# Patient Record
Sex: MALE | NOT HISPANIC OR LATINO | Employment: FULL TIME | URBAN - METROPOLITAN AREA
[De-identification: names, ages, dates, MRNs, and addresses within clinical notes are randomized per-mention and may not be internally consistent; named-entity substitution may affect disease eponyms.]

---

## 2023-11-30 ENCOUNTER — OFFICE VISIT (OUTPATIENT)
Dept: INTERNAL MEDICINE | Facility: CLINIC | Age: 43
End: 2023-11-30
Payer: COMMERCIAL

## 2023-11-30 VITALS
BODY MASS INDEX: 34.94 KG/M2 | DIASTOLIC BLOOD PRESSURE: 78 MMHG | HEIGHT: 75 IN | RESPIRATION RATE: 12 BRPM | SYSTOLIC BLOOD PRESSURE: 124 MMHG | WEIGHT: 281 LBS | HEART RATE: 80 BPM | TEMPERATURE: 97.7 F

## 2023-11-30 DIAGNOSIS — Z11.59 ENCOUNTER FOR HEPATITIS C SCREENING TEST FOR LOW RISK PATIENT: ICD-10-CM

## 2023-11-30 DIAGNOSIS — F41.9 ANXIETY: ICD-10-CM

## 2023-11-30 DIAGNOSIS — F33.3 SEVERE EPISODE OF RECURRENT MAJOR DEPRESSIVE DISORDER, WITH PSYCHOTIC FEATURES: ICD-10-CM

## 2023-11-30 DIAGNOSIS — F39 MOOD DISORDER: Primary | ICD-10-CM

## 2023-11-30 PROBLEM — E66.9 OBESITY (BMI 35.0-39.9 WITHOUT COMORBIDITY): Status: ACTIVE | Noted: 2023-11-30

## 2023-11-30 PROBLEM — E66.01 MORBID (SEVERE) OBESITY DUE TO EXCESS CALORIES: Status: ACTIVE | Noted: 2023-11-30

## 2023-11-30 PROBLEM — I10 HYPERTENSION: Status: ACTIVE | Noted: 2023-11-30

## 2023-11-30 LAB
ALBUMIN SERPL-MCNC: 4.8 G/DL (ref 3.5–5.2)
ALBUMIN/GLOB SERPL: 1.7 G/DL
ALP SERPL-CCNC: 67 U/L (ref 39–117)
ALT SERPL W P-5'-P-CCNC: 58 U/L (ref 1–41)
ANION GAP SERPL CALCULATED.3IONS-SCNC: 11.1 MMOL/L (ref 5–15)
AST SERPL-CCNC: 32 U/L (ref 1–40)
BASOPHILS # BLD AUTO: 0.02 10*3/MM3 (ref 0–0.2)
BASOPHILS NFR BLD AUTO: 0.3 % (ref 0–1.5)
BILIRUB SERPL-MCNC: 0.4 MG/DL (ref 0–1.2)
BUN SERPL-MCNC: 11 MG/DL (ref 6–20)
BUN/CREAT SERPL: 11.1 (ref 7–25)
CALCIUM SPEC-SCNC: 9.7 MG/DL (ref 8.6–10.5)
CHLORIDE SERPL-SCNC: 103 MMOL/L (ref 98–107)
CHOLEST SERPL-MCNC: 208 MG/DL (ref 0–200)
CO2 SERPL-SCNC: 27.9 MMOL/L (ref 22–29)
CREAT SERPL-MCNC: 0.99 MG/DL (ref 0.76–1.27)
DEPRECATED RDW RBC AUTO: 38.9 FL (ref 37–54)
EGFRCR SERPLBLD CKD-EPI 2021: 98.1 ML/MIN/1.73
EOSINOPHIL # BLD AUTO: 0.1 10*3/MM3 (ref 0–0.4)
EOSINOPHIL NFR BLD AUTO: 1.7 % (ref 0.3–6.2)
ERYTHROCYTE [DISTWIDTH] IN BLOOD BY AUTOMATED COUNT: 13.1 % (ref 12.3–15.4)
GLOBULIN UR ELPH-MCNC: 2.8 GM/DL
GLUCOSE SERPL-MCNC: 84 MG/DL (ref 65–99)
HCT VFR BLD AUTO: 45.2 % (ref 37.5–51)
HCV AB SER DONR QL: NORMAL
HDLC SERPL-MCNC: 41 MG/DL (ref 40–60)
HGB BLD-MCNC: 15.4 G/DL (ref 13–17.7)
IMM GRANULOCYTES # BLD AUTO: 0.06 10*3/MM3 (ref 0–0.05)
IMM GRANULOCYTES NFR BLD AUTO: 1 % (ref 0–0.5)
LDLC SERPL CALC-MCNC: 143 MG/DL (ref 0–100)
LDLC/HDLC SERPL: 3.43 {RATIO}
LYMPHOCYTES # BLD AUTO: 1.51 10*3/MM3 (ref 0.7–3.1)
LYMPHOCYTES NFR BLD AUTO: 25 % (ref 19.6–45.3)
MCH RBC QN AUTO: 28.1 PG (ref 26.6–33)
MCHC RBC AUTO-ENTMCNC: 34.1 G/DL (ref 31.5–35.7)
MCV RBC AUTO: 82.5 FL (ref 79–97)
MONOCYTES # BLD AUTO: 0.54 10*3/MM3 (ref 0.1–0.9)
MONOCYTES NFR BLD AUTO: 8.9 % (ref 5–12)
NEUTROPHILS NFR BLD AUTO: 3.81 10*3/MM3 (ref 1.7–7)
NEUTROPHILS NFR BLD AUTO: 63.1 % (ref 42.7–76)
NRBC BLD AUTO-RTO: 0 /100 WBC (ref 0–0.2)
PLATELET # BLD AUTO: 169 10*3/MM3 (ref 140–450)
PMV BLD AUTO: 11.8 FL (ref 6–12)
POTASSIUM SERPL-SCNC: 4.5 MMOL/L (ref 3.5–5.2)
PROT SERPL-MCNC: 7.6 G/DL (ref 6–8.5)
RBC # BLD AUTO: 5.48 10*6/MM3 (ref 4.14–5.8)
SODIUM SERPL-SCNC: 142 MMOL/L (ref 136–145)
TRIGL SERPL-MCNC: 131 MG/DL (ref 0–150)
TSH SERPL DL<=0.05 MIU/L-ACNC: 2.65 UIU/ML (ref 0.27–4.2)
VLDLC SERPL-MCNC: 24 MG/DL (ref 5–40)
WBC NRBC COR # BLD AUTO: 6.04 10*3/MM3 (ref 3.4–10.8)

## 2023-11-30 PROCEDURE — 80050 GENERAL HEALTH PANEL: CPT | Performed by: PHYSICIAN ASSISTANT

## 2023-11-30 PROCEDURE — 86803 HEPATITIS C AB TEST: CPT | Performed by: PHYSICIAN ASSISTANT

## 2023-11-30 PROCEDURE — 80061 LIPID PANEL: CPT | Performed by: PHYSICIAN ASSISTANT

## 2023-11-30 NOTE — PROGRESS NOTES
Office Note     Name: Harsha Brown    : 1980     MRN: [id number]     Chief Complaint  New Patient , Anxiety, ADD, and Hypertension    Subjective     History of Present Illness:  Harsha Brown is a 43 y.o. male who presents today to [facility] care.     It has been about 1.5 years since his last checkup. He has been taking something for his hypertension and heart medication. He has been taking something for anxiety that he wants to explore because his therapist told him that he shows a lot of symptoms related to bipolar disorder. He goes from very extremes in his emotions to feeling really good. He goes through a cycle or shift probably 2 or 3 times a week. His therapist told him that he showed some tendencies to ADHD. He occasionally hyper focuses on a task but that is not concerning to him. He was diagnosed with bipolar when he was 21 years old. He has never taken any other anxiety or depression medication. He sometimes has thoughts of harming himself. He has no intention or plans or desires to kill himself. He has a good support system at home that helps balance that out. He probably has desire to never wake up at least once a week.     He was on a cholesterol medication about 2 years ago. He started taking it and then 6 months later, he went back to his doctor and he was told that his cholesterol looked good and he took him off of it.     His brother has a history of bipolar disorder.     Review of Systems:   Review of Systems    Past Medical History:   Past Medical History:   Diagnosis Date    Anxiety     Hypertension        Past Surgical History:   Past Surgical History:   Procedure Laterality Date    TONSILLECTOMY         Immunizations:   Immunization History   Administered Date(s) Administered    Influenza, Unspecified 08/15/2023        Medications:     Current Outpatient Medications:     escitalopram (LEXAPRO) 10 MG tablet, Take 1 tablet by mouth Daily., Disp: , Rfl:     lisinopril  "(PRINIVIL,ZESTRIL) 10 MG tablet, Take 1 tablet by mouth Daily., Disp: , Rfl:     Cariprazine HCl (Vraylar) 1.5 MG capsule capsule, Take 1 capsule by mouth Daily., Disp: 14 capsule, Rfl: 0    Allergies:   No Known Allergies    Family History:   Family History   Problem Relation Age of Onset    No Known Problems Mother     No Known Problems Father     Anxiety disorder Sister     Bipolar disorder Brother     No Known Problems Maternal Grandmother     No Known Problems Maternal Grandfather     Arthritis Paternal Grandmother     No Known Problems Paternal Grandfather        Social History:   Social History     Socioeconomic History    Marital status:    Tobacco Use    Smoking status: Former     Packs/day: 0.25     Years: 4.00     Additional pack years: 0.00     Total pack years: 1.00     Types: Cigarettes     Start date:      Quit date:      Years since quittin.9     Passive exposure: Never    Smokeless tobacco: Never   Vaping Use    Vaping Use: Some days    Substances: THC    Passive vaping exposure: Yes   Substance and Sexual Activity    Alcohol use: Yes     Alcohol/week: 2.0 standard drinks of alcohol     Types: 1 Glasses of wine, 1 Shots of liquor per week    Drug use: Yes     Types: Marijuana    Sexual activity: Yes     Partners: Female     Birth control/protection: None         Objective     Vital Signs  /78 (BP Location: Right arm, Patient Position: Sitting, Cuff Size: Adult)   Pulse 80   Temp 97.7 °F (36.5 °C) (Infrared)   Resp 12   Ht 189.2 cm (74.5\")   Wt 127 kg (281 lb)   BMI 35.60 kg/m²   Estimated body mass index is 35.6 kg/m² as calculated from the following:    Height as of this encounter: 189.2 cm (74.5\").    Weight as of this encounter: 127 kg (281 lb).            Physical Exam  Vitals and nursing note reviewed.   Constitutional:       Appearance: Normal appearance.   Cardiovascular:      Rate and Rhythm: Normal rate and regular rhythm.      Pulses: Normal pulses.      " Heart sounds: Normal heart sounds.   Pulmonary:      Effort: Pulmonary effort is normal.      Breath sounds: Normal breath sounds.   Skin:     General: Skin is warm and dry.   Neurological:      General: No focal deficit present.      Mental Status: He is alert.   Psychiatric:         Mood and Affect: Mood normal.         Behavior: Behavior normal.          Assessment and Plan     1. Mood disorder    - Ambulatory Referral to Psychiatry  - Cariprazine HCl (Vraylar) 1.5 MG capsule capsule; Take 1 capsule by mouth Daily.  Dispense: 14 capsule; Refill: 0  - Comprehensive Metabolic Panel; Future  - CBC & Differential; Future  - Lipid Panel; Future  - TSH; Future  - Comprehensive Metabolic Panel  - CBC & Differential  - Lipid Panel  - TSH    2. Anxiety    - Ambulatory Referral to Psychiatry  - Cariprazine HCl (Vraylar) 1.5 MG capsule capsule; Take 1 capsule by mouth Daily.  Dispense: 14 capsule; Refill: 0  - Comprehensive Metabolic Panel; Future  - CBC & Differential; Future  - Lipid Panel; Future  - TSH; Future  - Comprehensive Metabolic Panel  - CBC & Differential  - Lipid Panel  - TSH    3. Severe episode of recurrent major depressive disorder, with psychotic features    - Ambulatory Referral to Psychiatry  - Cariprazine HCl (Vraylar) 1.5 MG capsule capsule; Take 1 capsule by mouth Daily.  Dispense: 14 capsule; Refill: 0  - Comprehensive Metabolic Panel; Future  - CBC & Differential; Future  - Lipid Panel; Future  - TSH; Future  - Comprehensive Metabolic Panel  - CBC & Differential  - Lipid Panel  - TSH    4. Encounter for hepatitis C screening test for low risk patient    - Hepatitis C Antibody; Future  - Hepatitis C Antibody       Reviewed medications, potential side effects and signs and symptoms to report. Discussed risk versus benefits of treatment plan with patient and/or family-including medications, labs and radiology that may be ordered. Addressed questions and concerns during visit. Patient and/or family  verbalized understanding and agree with plan. Instructed to call the office with any questions and report to ER with any life-threatening symptoms.       Follow Up  Return in about 2 weeks (around 12/14/2023) for Annual.    JUAN LUIS Pruitt [facility] INTERNAL MEDICINE & PED[facility]ATRICS  [address]  [facility] [address]  [phone]  Transcribed from ambient dictation for Ashley Estevez PA-C by Melissa Hagan.  11/30/23   10:08 EST    Patient or patient representative verbalized consent to the visit recording.  I have personally performed the services described in this document as transcribed by the above individual, and it is both accurate and complete.

## 2023-11-30 NOTE — PATIENT INSTRUCTIONS
MyPlate from USDA    MyPlate is an outline of a general healthy diet based on the Dietary Guidelines for Americans, 6625-2477, from the U.S. Department of Agriculture (USDA). It sets guidelines for how much food you should eat from each food group based on your age, sex, and level of physical activity.  What are tips for following MyPlate?  To follow MyPlate recommendations:  Eat a wide variety of fruits and vegetables, grains, and protein foods.  Serve smaller portions and eat less food throughout the day.  Limit portion sizes to avoid overeating.  Enjoy your food.  Get at least 150 minutes of exercise every week. This is about 30 minutes each day, 5 or more days per week.  It can be difficult to have every meal look like MyPlate. Think about MyPlate as eating guidelines for an entire day, rather than each individual meal.  Fruits and vegetables  Make one half of your plate fruits and vegetables.  Eat many different colors of fruits and vegetables each day.  For a 2,000-calorie daily food plan, eat:  2½ cups of vegetables every day.  2 cups of fruit every day.  1 cup is equal to:  1 cup raw or cooked vegetables.  1 cup raw fruit.  1 medium-sized orange, apple, or banana.  1 cup 100% fruit or vegetable juice.  2 cups raw leafy greens, such as lettuce, spinach, or kale.  ½ cup dried fruit.  Grains  One fourth of your plate should be grains.  Make at least half of the grains you eat each day whole grains.  For a 2,000-calorie daily food plan, eat 6 oz of grains every day.  1 oz is equal to:  1 slice bread.  1 cup cereal.  ½ cup cooked rice, cereal, or pasta.  Protein  One fourth of your plate should be protein.  Eat a wide variety of protein foods, including meat, poultry, fish, eggs, beans, nuts, and tofu.  For a 2,000-calorie daily food plan, eat 5½ oz of protein every day.  1 oz is equal to:  1 oz meat, poultry, or fish.  ¼ cup cooked beans.  1 egg.  ½ oz nuts or seeds.  1 Tbsp peanut butter.  Dairy  Drink fat-free  or low-fat (1%) milk.  Eat or drink dairy as a side to meals.  For a 2,000-calorie daily food plan, eat or drink 3 cups of dairy every day.  1 cup is equal to:  1 cup milk, yogurt, cottage cheese, or soy milk (soy beverage).  2 oz processed cheese.  1½ oz natural cheese.  Fats, oils, salt, and sugars  Only small amounts of oils are recommended.  Avoid foods that are high in calories and low in nutritional value (empty calories), like foods high in fat or added sugars.  Choose foods that are low in salt (sodium). Choose foods that have less than 140 milligrams (mg) of sodium per serving.  Drink water instead of sugary drinks. Drink enough fluid to keep your urine pale yellow.  Where to find support  Work with your health care provider or a dietitian to develop a customized eating plan that is right for you.  Download an davian (mobile application) to help you track your daily food intake.  Where to find more information  USDA: ChooseMyPlate.gov  Summary  MyPlate is a general guideline for healthy eating from the USDA. It is based on the Dietary Guidelines for Americans, 3969-5764.  In general, fruits and vegetables should take up one half of your plate, grains should take up one fourth of your plate, and protein should take up one fourth of your plate.  This information is not intended to replace advice given to you by your health care provider. Make sure you discuss any questions you have with your health care provider.  Document Revised: 11/08/2021 Document Reviewed: 11/08/2021  [employer] Patient Education © 2023 Elsevier Inc.    Bipolar I Disorder  Bipolar I disorder is a mental health disorder in which a person has episodes of emotional highs (sukhi) and may also have episodes of lows (depression). Bipolar I disorder differs from other bipolar disorders in that it involves extreme episodes of sukhi (manic episodes). These episodes last at least one week or involve severe symptoms that require a hospital stay to keep  the person safe.  What are the causes?  The cause of this condition is not known.  What increases the risk?  The following factors may make you more likely to develop this condition:  Having a family member with the disorder.  Having an imbalance of certain chemicals in the brain (neurotransmitters).  Experiencing stress, such as from illness, financial problems, or a death.  Having certain conditions that affect the brain or spinal cord (neurologic conditions).  Having had a brain injury (trauma).  What are the signs or symptoms?  Symptoms of bipolar I disorder include the following:  Symptoms of sukhi  Very high self-esteem or self-confidence.  Less need for sleep.  Unusual talkativeness. Speech may be very fast.  Racing thoughts with quick shifts between topics that may or may not be related (flight of ideas).  Being much more able or much less able to concentrate.  Increased purposeful activity, such as work, studies, or social activity.  Increased agitation. This could be pacing, squirming, fidgeting, or finger and toe tapping.  Impulsive behavior and poor judgment. These may lead to high-risk activities that are sexual, financial, or physical.  Symptoms of depression  Extreme sadness, uncontrollable crying, or feeling hopeless, worthless, or numb.  Sleep problems, such as trouble falling asleep or staying asleep (insomnia), waking early, or sleeping too much.  No longer enjoying things you used to enjoy.  Isolation, or spending time alone often.  Lack of energy or motivation, and moving more slowly than normal.  Trouble making decisions.  Increased appetite or loss of appetite.  Thoughts of death, or wanting to harm yourself.  Sometimes, you may have a mixed mood. This means having symptoms of sukhi and depression at the same time. Stress can often trigger these symptoms.  How is this diagnosed?  This condition may be diagnosed based on a mental health evaluation that includes a review of:  Emotional  episodes.  Medical history.  Use of alcohol, drugs, and prescription medicines. Certain medical conditions and substances can cause secondary bipolar disorder. This has symptoms like the symptoms of bipolar disorder.  Your health care provider may ask you to take a short test to help understand your symptoms. You may also be asked to follow up with a mental health provider for further evaluation or to start treatment.  How is this treated?         This condition is a long-term (chronic) illness. It is often managed with ongoing treatment rather than treatment only when symptoms occur. A combination of treatments is often used. Treatment may include:  Medicines, usually medicines called mood stabilizers. Medicines can be prescribed by a health care provider who specializes in treating mental health disorders (psychiatrist). If symptoms occur during use of a mood stabilizer, other medicines may be added.  Talk therapy (psychotherapy) to help you manage bipolar disorder. Talk therapy includes cognitive behavioral therapy (CBT) and family therapy.  Psychoeducation. This helps you and others understand how your disorder is managed. Include friends and family in educational sessions so they learn how best to support you.  Methods of managing your condition, such as journaling or relaxation exercises. Exercises include:  Yoga.  Meditation.  Deep breathing.  Lifestyle changes, such as:  Limiting alcohol and drug use.  Exercising regularly.  Setting a regular bedtime and wake time.  Eating a healthy diet.  Electroconvulsive therapy (ECT). This is a procedure in which electricity is applied to the brain through the scalp. ECT may be used for severe bipolar disorder when medicine and psychotherapy work too slowly or do not work.  Follow these instructions at home:  Activity  Return to your normal activities as told by your health care provider.  Find activities that you enjoy, and make time to do them.  Get regular  exercise.  Lifestyle    Follow a set schedule for eating and sleeping.  Eat a healthy diet that includes fresh fruits and vegetables, whole grains, low-fat dairy, and lean meat.  Get at least 7-8 hours of sleep each night.  Avoid using products that contain nicotine or tobacco. If you want help quitting, ask your health care provider.  Avoid alcohol and drugs. They can affect how medicine works and make symptoms worse.  General instructions  Take over-the-counter and prescription medicines only as told by your health care provider. You may think about stopping your medicine, but you need to take all your medicine as prescribed. This helps manage your symptoms.  Consider joining a support group. Your health care provider may be able to recommend one.  Talk with your family and friends about your treatment goals and how loved ones can help.  Keep all follow-up visits. This is important.  Where to find more information  National [place] on Mental Illness: dany.org  National [place] of Mental Health: nimh.nih.gov  Contact a health care provider if:  Your symptoms get worse, or your loved ones tell you that your symptoms are getting worse.  You have uncomfortable side effects from your medicine.  You have trouble sleeping.  You have trouble doing daily activities.  You feel unsafe in your surroundings.  You are using alcohol or drugs to manage your symptoms.  Get help right away if:  You have new symptoms.  You have thoughts about harming yourself or others.  You are considering suicide.  If you ever feel like you may hurt yourself or others, or have thoughts about taking your own life, get help right away. Go to your nearest emergency department or:  Call your local emergency services (585 in the [place]  Call a suicide crisis helpline, such as the National Suicide Prevention Lifeline at 1-692.328.5810 or 875 in the [place] This is open 24 hours a day in the [place]  Text the Crisis Text Line at 348885 (in the  [place]  Summary  Bipolar I disorder is a lifelong mental health disorder in which a person has episodes of sukhi and depression.  Treatment for this disorder involves a combination of treatments such as medicines and talk and behavioral therapies.  Include friends and family in educational sessions so they know how best to support you.  Get help right away if you are considering suicide.  This information is not intended to replace advice given to you by your health care provider. Make sure you discuss any questions you have with your health care provider.  Document Revised: 07/14/2022 Document Reviewed: 06/09/2022  Elsevier Patient Education © 2023 Elsevier Inc.

## 2023-12-20 ENCOUNTER — OFFICE VISIT (OUTPATIENT)
Dept: INTERNAL MEDICINE | Facility: CLINIC | Age: 43
End: 2023-12-20
Payer: COMMERCIAL

## 2023-12-20 VITALS
SYSTOLIC BLOOD PRESSURE: 130 MMHG | WEIGHT: 281 LBS | TEMPERATURE: 97.9 F | HEART RATE: 80 BPM | RESPIRATION RATE: 12 BRPM | BODY MASS INDEX: 35.6 KG/M2 | DIASTOLIC BLOOD PRESSURE: 84 MMHG

## 2023-12-20 DIAGNOSIS — F33.3 SEVERE EPISODE OF RECURRENT MAJOR DEPRESSIVE DISORDER, WITH PSYCHOTIC FEATURES: ICD-10-CM

## 2023-12-20 DIAGNOSIS — I1A.0 RESISTANT HYPERTENSION: Primary | ICD-10-CM

## 2023-12-20 DIAGNOSIS — F39 MOOD DISORDER: ICD-10-CM

## 2023-12-20 DIAGNOSIS — E66.01 MORBID (SEVERE) OBESITY DUE TO EXCESS CALORIES: ICD-10-CM

## 2023-12-20 DIAGNOSIS — F41.9 ANXIETY: ICD-10-CM

## 2023-12-20 RX ORDER — LISINOPRIL 10 MG/1
10 TABLET ORAL DAILY
Qty: 30 TABLET | Refills: 3 | Status: SHIPPED | OUTPATIENT
Start: 2023-12-20

## 2023-12-20 NOTE — PROGRESS NOTES
Office Note     Name: Harsha Brown    : 1980     MRN: [id number]     Chief Complaint  Annual Exam    Subjective     History of Present Illness:  Harsha Brown is a 43 y.o. male who presents today for an annual exam.    He likes the Vraylar, but he ran out of it about a week ago. After about 5 days being off of it, he started noticing that he was more irritable and rizvi.     He has a small lump on his right testicle, that has been there for 15 years. He noticed it in the [employer] in . He has not had an ultrasound. He denies any growth or change. His doctor told him it was probably nothing, but to follow up if it enlarges.     He had a COVID-19 infection 3 weeks ago.    His LDL was mildly elevated. His hepatitis C screening was negative.    He ran out of his blood pressure medication about 3 to 4 weeks ago. He has lost 26 pounds this year.    Review of Systems:   Review of Systems    Past Medical History:   Past Medical History:   Diagnosis Date    Anxiety     Hypertension        Past Surgical History:   Past Surgical History:   Procedure Laterality Date    TONSILLECTOMY         Immunizations:   Immunization History   Administered Date(s) Administered    Influenza, Unspecified 08/15/2023        Medications:     Current Outpatient Medications:     Cariprazine HCl (Vraylar) 1.5 MG capsule capsule, Take 1 capsule by mouth Daily., Disp: 30 capsule, Rfl: 3    escitalopram (LEXAPRO) 10 MG tablet, Take 1 tablet by mouth Daily., Disp: , Rfl:     lisinopril (PRINIVIL,ZESTRIL) 10 MG tablet, Take 1 tablet by mouth Daily., Disp: 30 tablet, Rfl: 3    Allergies:   No Known Allergies    Family History:   Family History   Problem Relation Age of Onset    No Known Problems Mother     No Known Problems Father     Anxiety disorder Sister     Bipolar disorder Brother     No Known Problems Maternal Grandmother     No Known Problems Maternal Grandfather     Arthritis Paternal Grandmother     No Known Problems Paternal  "Grandfather        Social History:   Social History     Socioeconomic History    Marital status:    Tobacco Use    Smoking status: Former     Packs/day: 0.25     Years: 4.00     Additional pack years: 0.00     Total pack years: 1.00     Types: Cigarettes     Start date:      Quit date:      Years since quittin.9     Passive exposure: Never    Smokeless tobacco: Never   Vaping Use    Vaping Use: Some days    Substances: THC    Passive vaping exposure: Yes   Substance and Sexual Activity    Alcohol use: Yes     Alcohol/week: 2.0 standard drinks of alcohol     Types: 1 Glasses of wine, 1 Shots of liquor per week    Drug use: Yes     Types: Marijuana    Sexual activity: Yes     Partners: Female     Birth control/protection: None         Objective     Vital Signs  /84 (BP Location: Left arm, Patient Position: Sitting, Cuff Size: Adult)   Pulse 80   Temp 97.9 °F (36.6 °C) (Infrared)   Resp 12   Wt 127 kg (281 lb)   BMI 35.60 kg/m²   Estimated body mass index is 35.6 kg/m² as calculated from the following:    Height as of 23: 189.2 cm (74.5\").    Weight as of this encounter: 127 kg (281 lb).            Physical Exam  Vitals and nursing note reviewed.   Constitutional:       Appearance: Normal appearance.   HENT:      Right Ear: Tympanic membrane normal.      Left Ear: Tympanic membrane normal.      Nose: Nose normal.      Mouth/Throat:      Mouth: Mucous membranes are moist.      Pharynx: Oropharynx is clear.   Eyes:      Conjunctiva/sclera: Conjunctivae normal.      Pupils: Pupils are equal, round, and reactive to light.   Cardiovascular:      Rate and Rhythm: Normal rate and regular rhythm.      Pulses: Normal pulses.      Heart sounds: Normal heart sounds.   Pulmonary:      Effort: Pulmonary effort is normal.      Breath sounds: Normal breath sounds.   Abdominal:      General: Abdomen is flat. Bowel sounds are normal.      Palpations: Abdomen is soft.   Genitourinary:     Comments: " Patient politely deferred  At this time was counseled to have self exams monthly and to call immediately with any lumps, bumps, pain, rash.  Patient verbalized understanding.    Musculoskeletal:         General: Normal range of motion.   Skin:     General: Skin is warm and dry.   Neurological:      General: No focal deficit present.      Mental Status: He is alert.   Psychiatric:         Mood and Affect: Mood normal.         Behavior: Behavior normal.          Assessment and Plan     Problem List Items Addressed This Visit       Morbid (severe) obesity due to excess calories (*specify comorbidity)    Anxiety    Overview     Managed well with Vraylar         Relevant Medications    Cariprazine HCl (Vraylar) 1.5 MG capsule capsule    Hypertension - Primary    Overview     Managed well with lisinopril    Wt Readings from Last 3 Encounters:   12/20/23 127 kg (281 lb)   11/30/23 127 kg (281 lb)   11/27/23 127 kg (280 lb)     Temp Readings from Last 3 Encounters:   12/20/23 97.9 °F (36.6 °C) (Infrared)   11/30/23 97.7 °F (36.5 °C) (Infrared)   11/27/23 97.9 °F (36.6 °C) (Temporal)     BP Readings from Last 3 Encounters:   12/20/23 130/84   11/30/23 124/78   11/27/23 127/76     Pulse Readings from Last 3 Encounters:   12/20/23 80   11/30/23 80   11/27/23 75            Relevant Medications    lisinopril (PRINIVIL,ZESTRIL) 10 MG tablet    Mood disorder    Overview     Managed well with Vraylar         Relevant Medications    Cariprazine HCl (Vraylar) 1.5 MG capsule capsule    Severe episode of recurrent major depressive disorder, with psychotic features    Overview     Managed well with Vraylar         Relevant Medications    Cariprazine HCl (Vraylar) 1.5 MG capsule capsule       Patient politely declined testicular US at this time.       Reviewed medications, potential side effects and signs and symptoms to report. Discussed risk versus benefits of treatment plan with patient and/or family-including medications, labs and  radiology that may be ordered. Addressed questions and concerns during visit. Patient and/or family verbalized understanding and agree with plan. Instructed to call the office with any questions and report to ER with any life-threatening symptoms.     Patient does use a seatbelt, use sunscreen, helmet when necessary, smoke detectors and carbon monoxide detectors are in home, safe sexual practices and does not participate in illicit drug use.      Recommended annual Dental, Eye and Dermatology exam.  Explained to patient no referral should be required however I am happy to help if one is needed      Follow Up  Return in about 6 months (around 6/20/2024) for Recheck, labs.    JUAN LUIS Pruitt [facility] INTERNAL MEDICINE [facility] PED[facility]ATRICS  [address] [address] [phone]  Transcribed from ambient dictation for Ashley Estevez PA-C by Melissa Hagan.  12/20/23   15:56 EST    Patient or patient representative verbalized consent to the visit recording.  I have personally performed the services described in this document as transcribed by the above individual, and it is both accurate and complete.

## 2023-12-21 ENCOUNTER — PATIENT MESSAGE (OUTPATIENT)
Dept: INTERNAL MEDICINE | Facility: CLINIC | Age: 43
End: 2023-12-21
Payer: COMMERCIAL

## 2023-12-27 NOTE — TELEPHONE ENCOUNTER
PA submitted through [employer]    Key: [id number]      Your plan has criteria in place for coverage of this medicine. We needed additional  clinical information from your prescriber in order to make a decision to either approve or  deny the request. We did not receive the additional clinical information within the time  allowed to make the decision; therefore, the request was denied. This is the initial  adverse determination for this request. The request was denied because:  You do not meet the requirements of your plan.  Your plan covers this drug when your doctor provides documentation that you meet any  of these conditions:  -You have a clinical condition for which there is no appropriate formulary alternative  -You need a form of the drug that is not on the formulary  -You tried the required number of preferred formulary alternatives on your formulary  first. These drugs did not work for you or you cannot take them.  Your request has been denied based on the information we have.  Formulary alternative(s) are aripiprazole, asenapine, clozapine, lurasidone, olanzapine,  paliperidone extended-release, quetiapine, quetiapine extended-release, risperidone,  ziprasidone. Requirement: 3 in a class with 3 or more alternatives, 2 in a class with 2  alternatives, or 1 in a class with only 1 alternative. Please refer to your plan documents for a complete list of alternatives.  Note: Formulary alternatives may require a prior authorization. Your prescriber will be  responsible for determining what alternative is appropriate for you

## 2024-04-29 DIAGNOSIS — I1A.0 RESISTANT HYPERTENSION: ICD-10-CM

## 2024-04-29 RX ORDER — LISINOPRIL 10 MG/1
10 TABLET ORAL DAILY
Qty: 30 TABLET | Refills: 3 | Status: SHIPPED | OUTPATIENT
Start: 2024-04-29

## 2024-05-10 DIAGNOSIS — I1A.0 RESISTANT HYPERTENSION: ICD-10-CM

## 2024-05-10 RX ORDER — ESCITALOPRAM OXALATE 20 MG/1
20 TABLET ORAL DAILY
Qty: 30 TABLET | Refills: 3 | Status: SHIPPED | OUTPATIENT
Start: 2024-05-10

## 2024-05-10 RX ORDER — LISINOPRIL 10 MG/1
10 TABLET ORAL DAILY
Qty: 30 TABLET | Refills: 3 | OUTPATIENT
Start: 2024-05-10

## 2024-05-10 RX ORDER — OLANZAPINE 10 MG/1
10 TABLET ORAL NIGHTLY
Qty: 30 TABLET | Refills: 3 | Status: SHIPPED | OUTPATIENT
Start: 2024-05-10

## 2024-06-27 ENCOUNTER — OFFICE VISIT (OUTPATIENT)
Dept: INTERNAL MEDICINE | Facility: CLINIC | Age: 44
End: 2024-06-27
Payer: COMMERCIAL

## 2024-06-27 VITALS
RESPIRATION RATE: 16 BRPM | DIASTOLIC BLOOD PRESSURE: 72 MMHG | TEMPERATURE: 97.8 F | BODY MASS INDEX: 36.08 KG/M2 | SYSTOLIC BLOOD PRESSURE: 110 MMHG | HEART RATE: 64 BPM | WEIGHT: 281 LBS

## 2024-06-27 DIAGNOSIS — I1A.0 RESISTANT HYPERTENSION: ICD-10-CM

## 2024-06-27 DIAGNOSIS — F33.3 SEVERE EPISODE OF RECURRENT MAJOR DEPRESSIVE DISORDER, WITH PSYCHOTIC FEATURES: ICD-10-CM

## 2024-06-27 DIAGNOSIS — I10 PRIMARY HYPERTENSION: Primary | ICD-10-CM

## 2024-06-27 DIAGNOSIS — F39 MOOD DISORDER: ICD-10-CM

## 2024-06-27 DIAGNOSIS — E66.01 MORBID (SEVERE) OBESITY DUE TO EXCESS CALORIES: ICD-10-CM

## 2024-06-27 DIAGNOSIS — F41.9 ANXIETY: ICD-10-CM

## 2024-06-27 DIAGNOSIS — J06.9 ACUTE UPPER RESPIRATORY INFECTION: ICD-10-CM

## 2024-06-27 LAB
ALBUMIN SERPL-MCNC: 4.6 G/DL (ref 3.5–5.2)
ALBUMIN/GLOB SERPL: 1.6 G/DL
ALP SERPL-CCNC: 73 U/L (ref 39–117)
ALT SERPL W P-5'-P-CCNC: 28 U/L (ref 1–41)
ANION GAP SERPL CALCULATED.3IONS-SCNC: 10 MMOL/L (ref 5–15)
AST SERPL-CCNC: 20 U/L (ref 1–40)
BASOPHILS # BLD AUTO: 0.02 10*3/MM3 (ref 0–0.2)
BASOPHILS NFR BLD AUTO: 0.4 % (ref 0–1.5)
BILIRUB SERPL-MCNC: 0.5 MG/DL (ref 0–1.2)
BUN SERPL-MCNC: 13 MG/DL (ref 6–20)
BUN/CREAT SERPL: 11.6 (ref 7–25)
CALCIUM SPEC-SCNC: 9.8 MG/DL (ref 8.6–10.5)
CHLORIDE SERPL-SCNC: 107 MMOL/L (ref 98–107)
CHOLEST SERPL-MCNC: 230 MG/DL (ref 0–200)
CO2 SERPL-SCNC: 27 MMOL/L (ref 22–29)
CREAT SERPL-MCNC: 1.12 MG/DL (ref 0.76–1.27)
DEPRECATED RDW RBC AUTO: 38.4 FL (ref 37–54)
EGFRCR SERPLBLD CKD-EPI 2021: 84.6 ML/MIN/1.73
EOSINOPHIL # BLD AUTO: 0.05 10*3/MM3 (ref 0–0.4)
EOSINOPHIL NFR BLD AUTO: 1 % (ref 0.3–6.2)
ERYTHROCYTE [DISTWIDTH] IN BLOOD BY AUTOMATED COUNT: 12.9 % (ref 12.3–15.4)
GLOBULIN UR ELPH-MCNC: 2.9 GM/DL
GLUCOSE SERPL-MCNC: 91 MG/DL (ref 65–99)
HCT VFR BLD AUTO: 46 % (ref 37.5–51)
HDLC SERPL-MCNC: 48 MG/DL (ref 40–60)
HGB BLD-MCNC: 15.5 G/DL (ref 13–17.7)
IMM GRANULOCYTES # BLD AUTO: 0.03 10*3/MM3 (ref 0–0.05)
IMM GRANULOCYTES NFR BLD AUTO: 0.6 % (ref 0–0.5)
LDLC SERPL CALC-MCNC: 157 MG/DL (ref 0–100)
LDLC/HDLC SERPL: 3.22 {RATIO}
LYMPHOCYTES # BLD AUTO: 1.13 10*3/MM3 (ref 0.7–3.1)
LYMPHOCYTES NFR BLD AUTO: 22.2 % (ref 19.6–45.3)
MCH RBC QN AUTO: 27.8 PG (ref 26.6–33)
MCHC RBC AUTO-ENTMCNC: 33.7 G/DL (ref 31.5–35.7)
MCV RBC AUTO: 82.6 FL (ref 79–97)
MONOCYTES # BLD AUTO: 0.46 10*3/MM3 (ref 0.1–0.9)
MONOCYTES NFR BLD AUTO: 9 % (ref 5–12)
NEUTROPHILS NFR BLD AUTO: 3.41 10*3/MM3 (ref 1.7–7)
NEUTROPHILS NFR BLD AUTO: 66.8 % (ref 42.7–76)
NRBC BLD AUTO-RTO: 0 /100 WBC (ref 0–0.2)
PLATELET # BLD AUTO: 135 10*3/MM3 (ref 140–450)
PMV BLD AUTO: 11.9 FL (ref 6–12)
POTASSIUM SERPL-SCNC: 4.3 MMOL/L (ref 3.5–5.2)
PROT SERPL-MCNC: 7.5 G/DL (ref 6–8.5)
RBC # BLD AUTO: 5.57 10*6/MM3 (ref 4.14–5.8)
SODIUM SERPL-SCNC: 144 MMOL/L (ref 136–145)
TRIGL SERPL-MCNC: 137 MG/DL (ref 0–150)
TSH SERPL DL<=0.05 MIU/L-ACNC: 2.11 UIU/ML (ref 0.27–4.2)
VLDLC SERPL-MCNC: 25 MG/DL (ref 5–40)
WBC NRBC COR # BLD AUTO: 5.1 10*3/MM3 (ref 3.4–10.8)

## 2024-06-27 PROCEDURE — 80050 GENERAL HEALTH PANEL: CPT | Performed by: PHYSICIAN ASSISTANT

## 2024-06-27 PROCEDURE — 99214 OFFICE O/P EST MOD 30 MIN: CPT | Performed by: PHYSICIAN ASSISTANT

## 2024-06-27 PROCEDURE — 80061 LIPID PANEL: CPT | Performed by: PHYSICIAN ASSISTANT

## 2024-06-27 RX ORDER — OLANZAPINE 10 MG/1
10 TABLET ORAL NIGHTLY
Qty: 90 TABLET | Refills: 3 | Status: SHIPPED | OUTPATIENT
Start: 2024-06-27

## 2024-06-27 RX ORDER — ESCITALOPRAM OXALATE 20 MG/1
20 TABLET ORAL DAILY
Qty: 90 TABLET | Refills: 3 | Status: SHIPPED | OUTPATIENT
Start: 2024-06-27

## 2024-06-27 RX ORDER — LISINOPRIL 10 MG/1
10 TABLET ORAL DAILY
Qty: 90 TABLET | Refills: 3 | Status: SHIPPED | OUTPATIENT
Start: 2024-06-27

## 2024-06-27 RX ORDER — CETIRIZINE HYDROCHLORIDE 10 MG/1
10 TABLET ORAL DAILY
Qty: 90 TABLET | Refills: 3 | Status: SHIPPED | OUTPATIENT
Start: 2024-06-27

## 2024-06-27 NOTE — PROGRESS NOTES
Office Note     Name: Harsha Brown    : 1980     MRN: [id number]     Chief Complaint  Follow-up (6 Month Recheck, Labs)    Subjective     History of Present Illness:  Harsha Brown is a 43 y.o. male who presents today for recheck at anxiety blood pressure and labs.  Patient does report he is compliant with all his medications tolerating well without any unwanted side effects at this time.  Patient denies any home Violeta ideation at this time.  Patient has no other complaints time.    Review of Systems:   Review of Systems    Past Medical History:   Past Medical History:   Diagnosis Date    Anxiety     Bipolar 1 disorder     Hypertension        Past Surgical History:   Past Surgical History:   Procedure Laterality Date    TONSILLECTOMY         Immunizations:   Immunization History   Administered Date(s) Administered    Influenza, Unspecified 08/15/2023        Medications:     Current Outpatient Medications:     cetirizine (zyrTEC) 10 MG tablet, Take 1 tablet by mouth Daily., Disp: 90 tablet, Rfl: 3    escitalopram (LEXAPRO) 20 MG tablet, Take 1 tablet by mouth Daily., Disp: 90 tablet, Rfl: 3    lisinopril (PRINIVIL,ZESTRIL) 10 MG tablet, Take 1 tablet by mouth Daily., Disp: 90 tablet, Rfl: 3    OLANZapine (zyPREXA) 10 MG tablet, Take 1 tablet by mouth Every Night., Disp: 90 tablet, Rfl: 3    Allergies:   No Known Allergies    Family History:   Family History   Problem Relation Age of Onset    No Known Problems Mother     No Known Problems Father     Anxiety disorder Sister     Bipolar disorder Brother     No Known Problems Maternal Grandmother     No Known Problems Maternal Grandfather     Arthritis Paternal Grandmother     No Known Problems Paternal Grandfather        Social History:   Social History     Socioeconomic History    Marital status:    Tobacco Use    Smoking status: Some Days     Current packs/day: 0.00     Average packs/day: 0.3 packs/day for 4.0 years (1.0 ttl pk-yrs)     Types:  "Cigarettes     Start date:      Last attempt to quit:      Years since quittin.4     Passive exposure: Never    Smokeless tobacco: Never   Vaping Use    Vaping status: Former    Substances: THC    Devices: Disposable    Passive vaping exposure: Yes   Substance and Sexual Activity    Alcohol use: Yes     Alcohol/week: 2.0 standard drinks of alcohol     Types: 1 Glasses of wine, 1 Shots of liquor per week    Drug use: Yes     Types: Marijuana    Sexual activity: Yes     Partners: Female     Birth control/protection: None         Objective     Vital Signs  /72 (BP Location: Right arm, Patient Position: Sitting, Cuff Size: Adult)   Pulse 64   Temp 97.8 °F (36.6 °C) (Infrared)   Resp 16   Wt 127 kg (281 lb)   BMI 36.08 kg/m²   Estimated body mass index is 36.08 kg/m² as calculated from the following:    Height as of 3/22/24: 188 cm (74\").    Weight as of this encounter: 127 kg (281 lb).            Physical Exam  Vitals and nursing note reviewed.   Constitutional:       Appearance: Normal appearance.   Cardiovascular:      Rate and Rhythm: Normal rate and regular rhythm.      Pulses: Normal pulses.      Heart sounds: Normal heart sounds.   Pulmonary:      Effort: Pulmonary effort is normal.      Breath sounds: Normal breath sounds.   Skin:     General: Skin is warm and dry.   Neurological:      General: No focal deficit present.      Mental Status: He is alert.   Psychiatric:         Mood and Affect: Mood normal.         Behavior: Behavior normal.          Assessment and Plan     1. Primary hypertension      2. Morbid (severe) obesity due to excess calories (*specify comorbidity)      3. Anxiety    - escitalopram (LEXAPRO) 20 MG tablet; Take 1 tablet by mouth Daily.  Dispense: 90 tablet; Refill: 3  - OLANZapine (zyPREXA) 10 MG tablet; Take 1 tablet by mouth Every Night.  Dispense: 90 tablet; Refill: 3    4. Severe episode of recurrent major depressive disorder, with psychotic features    - " escitalopram (LEXAPRO) 20 MG tablet; Take 1 tablet by mouth Daily.  Dispense: 90 tablet; Refill: 3  - OLANZapine (zyPREXA) 10 MG tablet; Take 1 tablet by mouth Every Night.  Dispense: 90 tablet; Refill: 3    5. Resistant hypertension    - lisinopril (PRINIVIL,ZESTRIL) 10 MG tablet; Take 1 tablet by mouth Daily.  Dispense: 90 tablet; Refill: 3    6. Acute upper respiratory infection    - cetirizine (zyrTEC) 10 MG tablet; Take 1 tablet by mouth Daily.  Dispense: 90 tablet; Refill: 3    7. Mood disorder    - escitalopram (LEXAPRO) 20 MG tablet; Take 1 tablet by mouth Daily.  Dispense: 90 tablet; Refill: 3  - OLANZapine (zyPREXA) 10 MG tablet; Take 1 tablet by mouth Every Night.  Dispense: 90 tablet; Refill: 3       Reviewed medications, potential side effects and signs and symptoms to report. Discussed risk versus benefits of treatment plan with patient and/or family-including medications, labs and radiology that may be ordered. Addressed questions and concerns during visit. Patient and/or family verbalized understanding and agree with plan. Instructed to call the office with any questions and report to ER with any life-threatening symptoms.         Follow Up  Return in about 6 months (around 12/23/2024) for Annual.    JUAN LUIS [facility]  [address] [address]  [phone]

## 2024-07-06 ENCOUNTER — TELEMEDICINE (OUTPATIENT)
Dept: FAMILY MEDICINE CLINIC | Facility: TELEHEALTH | Age: 44
End: 2024-07-06
Payer: COMMERCIAL

## 2024-07-06 DIAGNOSIS — L23.7 POISON IVY: Primary | ICD-10-CM

## 2024-07-06 RX ORDER — TRIAMCINOLONE ACETONIDE 1 MG/G
1 OINTMENT TOPICAL 3 TIMES DAILY
Qty: 30 G | Refills: 0 | Status: SHIPPED | OUTPATIENT
Start: 2024-07-06

## 2024-07-06 RX ORDER — PREDNISONE 10 MG/1
TABLET ORAL DAILY
Qty: 21 EACH | Refills: 0 | Status: SHIPPED | OUTPATIENT
Start: 2024-07-06 | End: 2024-07-12

## 2024-07-06 NOTE — PROGRESS NOTES
You have chosen to receive care through a telehealth visit.  Do you consent to use a video/audio connection for your medical care today? Yes     HPI  Harsha Brown is a 43 y.o. male  presents with complaint of 2 day h/o itchy red rash after working in the yard cleaning out a fence row. He reports it is on his leg, arm and chest bilaterally. He reports no pain associated with rash.     Review of Systems   Constitutional: Negative.    Skin:  Positive for color change and rash.   Psychiatric/Behavioral: Negative.         Past Medical History:   Diagnosis Date    Anxiety     Bipolar 1 disorder     Hypertension        Family History   Problem Relation Age of Onset    No Known Problems Mother     No Known Problems Father     Anxiety disorder Sister     Bipolar disorder Brother     No Known Problems Maternal Grandmother     No Known Problems Maternal Grandfather     Arthritis Paternal Grandmother     No Known Problems Paternal Grandfather        Social History     Socioeconomic History    Marital status:    Tobacco Use    Smoking status: Some Days     Current packs/day: 0.00     Average packs/day: 0.3 packs/day for 4.0 years (1.0 ttl pk-yrs)     Types: Cigarettes     Start date:      Last attempt to quit:      Years since quittin.5     Passive exposure: Never    Smokeless tobacco: Never   Vaping Use    Vaping status: Former    Substances: THC    Devices: Disposable    Passive vaping exposure: Yes   Substance and Sexual Activity    Alcohol use: Yes     Alcohol/week: 2.0 standard drinks of alcohol     Types: 1 Glasses of wine, 1 Shots of liquor per week    Drug use: Yes     Types: Marijuana    Sexual activity: Yes     Partners: Female     Birth control/protection: None         There were no vitals taken for this visit.    PHYSICAL EXAM  Physical Exam   Constitutional: He is oriented to person, place, and time. He appears well-developed and well-nourished. He does not have a sickly appearance. He does not  appear ill. No distress.   HENT:   Head: Normocephalic and atraumatic.   Pulmonary/Chest: Effort normal.  No respiratory distress.  Neurological: He is alert and oriented to person, place, and time.   Skin: Rash noted. Rash is maculopapular and vesicular. There is erythema.   Psychiatric: He has a normal mood and affect.   Vitals reviewed.      Diagnoses and all orders for this visit:    1. Poison ivy (Primary)  -     predniSONE (DELTASONE) 10 MG (21) dose pack; Take  by mouth Daily for 6 days.  Dispense: 21 each; Refill: 0  -     triamcinolone (KENALOG) 0.1 % ointment; Apply 1 Application topically to the appropriate area as directed 3 (Three) Times a Day.  Dispense: 30 g; Refill: 0    Stay cool   Keep rash clean and avoid scratching  Benadryl as directed prn itching.   Take steroids with food  Zanfel as directed       FOLLOW-UP  As discussed during visit with [facility], if symptoms worsen or fail to improve, follow-up with PCP/Urgent Care/Emergency Department.    Patient verbalizes understanding of medications, instructions for treatment and follow-up.    Leighann Dasilva, KATY  07/06/2024  11:21 EDT    The use of a video visit has been reviewed with the patient and verbal informed consent has been obtained. Myself and Harsha Brown participated in this visit. The patient is located in [facility], and I am located in [place], [place]. [employer] and [employer] were utilized.